# Patient Record
Sex: FEMALE | Race: BLACK OR AFRICAN AMERICAN | NOT HISPANIC OR LATINO | Employment: OTHER | URBAN - METROPOLITAN AREA
[De-identification: names, ages, dates, MRNs, and addresses within clinical notes are randomized per-mention and may not be internally consistent; named-entity substitution may affect disease eponyms.]

---

## 2019-03-20 ENCOUNTER — OFFICE VISIT (OUTPATIENT)
Dept: DERMATOLOGY | Facility: CLINIC | Age: 59
End: 2019-03-20
Payer: MEDICARE

## 2019-03-20 DIAGNOSIS — M67.40 GANGLION CYST: Primary | ICD-10-CM

## 2019-03-20 DIAGNOSIS — B35.4 TINEA CORPORIS: ICD-10-CM

## 2019-03-20 PROCEDURE — 99203 OFFICE O/P NEW LOW 30 MIN: CPT | Mod: S$GLB,,, | Performed by: DERMATOLOGY

## 2019-03-20 PROCEDURE — 99999 PR PBB SHADOW E&M-NEW PATIENT-LVL III: ICD-10-PCS | Mod: PBBFAC,,, | Performed by: DERMATOLOGY

## 2019-03-20 PROCEDURE — 99999 PR PBB SHADOW E&M-NEW PATIENT-LVL III: CPT | Mod: PBBFAC,,, | Performed by: DERMATOLOGY

## 2019-03-20 PROCEDURE — 99203 PR OFFICE/OUTPT VISIT, NEW, LEVL III, 30-44 MIN: ICD-10-PCS | Mod: S$GLB,,, | Performed by: DERMATOLOGY

## 2019-03-20 RX ORDER — ALBUTEROL SULFATE 5 MG/ML
2.5 SOLUTION RESPIRATORY (INHALATION) EVERY 6 HOURS PRN
COMMUNITY

## 2019-03-20 RX ORDER — MOMETASONE FUROATE 50 UG/1
2 SPRAY, METERED NASAL DAILY
COMMUNITY

## 2019-03-20 RX ORDER — GLIMEPIRIDE 4 MG/1
4 TABLET ORAL
COMMUNITY
End: 2019-10-17

## 2019-03-20 RX ORDER — CYCLOBENZAPRINE HCL 10 MG
10 TABLET ORAL 3 TIMES DAILY PRN
COMMUNITY

## 2019-03-20 RX ORDER — ECONAZOLE NITRATE 10 MG/G
CREAM TOPICAL
Qty: 85 G | Refills: 1 | Status: SHIPPED | OUTPATIENT
Start: 2019-03-20

## 2019-03-20 NOTE — PROGRESS NOTES
Subjective:       Patient ID:  Geno Marrero is a 58 y.o. female who presents for   Chief Complaint   Patient presents with    Rash     c/o rash to back and left inner thigh x several yrs,,itchy and scaly    Cyst     c/o cyst to right wrist     History of Present Illness: The patient presents with chief complaint of rash.  Location: back and left inner thigh  Duration: several yrs  Signs/Symptoms: itchy and scaly    Prior treatments: Anti-itch cream, HTC cream    She also c/o cyst of the right wrist near radial artery x 1 month, + aching pain. No tx tried.         Review of Systems   Constitutional: Negative for fever and chills.   Gastrointestinal: Negative for nausea and vomiting.   Skin: Positive for itching, rash and dry skin. Negative for daily sunscreen use, activity-related sunscreen use and recent sunburn.   Hematologic/Lymphatic: Does not bruise/bleed easily.        Objective:    Physical Exam   Constitutional: She appears well-developed and well-nourished. No distress.   Neurological: She is alert and oriented to person, place, and time. She is not disoriented.   Psychiatric: She has a normal mood and affect.   Skin:   Areas Examined (abnormalities noted in diagram):   Head / Face Inspection Performed  Neck Inspection Performed  Chest / Axilla Inspection Performed  Abdomen Inspection Performed  Genitals / Buttocks / Groin Inspection Performed  Back Inspection Performed  RUE Inspected  LUE Inspection Performed  RLE Inspected  LLE Inspection Performed  Nails and Digits Inspection Performed                  KOH: positive for few hyphae    Assessment / Plan:        Ganglion cyst  -     Ambulatory referral to Hand Surgery  -     Right wrist    Tinea corporis  -     econazole nitrate 1 % cream; AAA bid  Dispense: 85 g; Refill: 1  -     Given drug allergies and co-morbidities, will avoid PO lamisil.  Will start above med and discussed fomite reduction.  Consider punch biopsy to r/o other rash if no  improvement.                Follow-up in about 6 weeks (around 5/1/2019) for follow up with MAYELIN Sofia.

## 2019-03-20 NOTE — PATIENT INSTRUCTIONS
Ringworm of the Skin    Ringworm is a fungal infection of the skin. Despite the name, a worm doesn't cause it. The cause of ringworm is a fungus that infects the outer layers of the skin. It is also not caused by bed bugs, scabies, or lice. These are totally different.  The medical term for ringworm is tinea. It can affect most parts of your body, although it seems to do better in moist areas of the body and around hair. It can be on almost any part of your body, including:  · Arms, hands, legs, chest, feet, and back  · Scalp  · Beard  · Groin  · Between the toes  Depending on where it is located, sometimes the name changes:  · Tinea capitis (scalp)  · Tinea cruris (groin)  · Tinea corporis (body)  · Tinea pedis (feet)  Causes  Ringworm is very common all over the world, including the [place] It can take less than 1 week up to 2 weeks before you develop the infection after being exposed. So, you may not figure out the exact cause.  It is spread through direct contact with:  · An infected person or animal  · Infected soil, or objects such as towels, clothing, and farley  Symptoms  At first you might not notice ringworm. Or you may just see a small, red, often raised itchy spot or pimple. Sometimes there may only be one spot. At other times there may be several. Ringworm can look slightly different on different parts of the body, but there are some things are always present:  · Irregular, round, oval or ring-shaped, which is why it's called ringworm  · Clearer or lighter color at the center, since it spreads from the center of the spot outward  · Red or inflamed look  · Raised  · Itchy  · Scaly, dry, or flaky  Home care  Follow these tips to help care for yourself at home:  · Leave it alone. Don't scratch at the rash or pick it. This can increase the chance of infection and scarring.  · Take medicine as prescribed. If you were prescribed a cream, apply it exactly as directed. Make sure to put the cream not just on the  rash, but also on the skin 1 or 2 inches around it. Medicine by mouth is sometimes needed, particularly for ringworm on the scalp. Take it as directed and until your healthcare provider says to stop.  · Keep it from spreading to others. Untreated ringworm of the skin is contagious by skin-to-skin contact. Your child may return to school 2 days after treatment has started.  Prevention  To some degree, prevention depends on what part of your body was affected. In general, the following good hygiene can help.  · Clean up after you get dirty or sweaty, or after using a locker room.  · When possible, dont share farley and brushes.  · Avoid having your skin and feet wet or damp for long periods.  · Wear clean, loose-fitting underwear.  Follow-up care  Follow up with your healthcare provider as advised by our staff if the rash does not improve after 10 days of treatment or if the rash spreads to other areas of the body.  When to seek medical advice  Call your healthcare provider right away if any of these occur:  · Redness around the rash gets worse  · Fluid drains from the rash  · Fever of 100.4ºF (38ºC) or higher, or as directed by your healthcare provider  Date Last Reviewed: 8/1/2016  © 9875-1677 [employer]. [address] [place] [address], [place] [address]. All rights reserved. This information is not intended as a substitute for professional medical care. Always follow your healthcare professional's instructions.

## 2019-10-11 ENCOUNTER — OFFICE VISIT (OUTPATIENT)
Dept: ENDOCRINOLOGY | Facility: CLINIC | Age: 59
End: 2019-10-11
Payer: MEDICARE

## 2019-10-11 VITALS
WEIGHT: 293 LBS | SYSTOLIC BLOOD PRESSURE: 152 MMHG | BODY MASS INDEX: 48.05 KG/M2 | TEMPERATURE: 99 F | DIASTOLIC BLOOD PRESSURE: 86 MMHG | HEART RATE: 66 BPM | RESPIRATION RATE: 18 BRPM

## 2019-10-11 DIAGNOSIS — E11.65 UNCONTROLLED TYPE 2 DIABETES MELLITUS WITH HYPERGLYCEMIA: Primary | ICD-10-CM

## 2019-10-11 DIAGNOSIS — E66.01 MORBID OBESITY: ICD-10-CM

## 2019-10-11 DIAGNOSIS — L83 ACANTHOSIS NIGRICANS: ICD-10-CM

## 2019-10-11 DIAGNOSIS — R73.09 ELEVATED HEMOGLOBIN A1C: ICD-10-CM

## 2019-10-11 LAB
GLUCOSE SERPL-MCNC: 74 MG/DL (ref 70–110)
GLUCOSE SERPL-MCNC: 85 MG/DL (ref 70–110)

## 2019-10-11 PROCEDURE — 3008F BODY MASS INDEX DOCD: CPT | Mod: CPTII,S$GLB,, | Performed by: INTERNAL MEDICINE

## 2019-10-11 PROCEDURE — 99999 PR PBB SHADOW E&M-EST. PATIENT-LVL III: CPT | Mod: PBBFAC,,, | Performed by: INTERNAL MEDICINE

## 2019-10-11 PROCEDURE — 99204 PR OFFICE/OUTPT VISIT, NEW, LEVL IV, 45-59 MIN: ICD-10-PCS | Mod: 25,S$GLB,, | Performed by: INTERNAL MEDICINE

## 2019-10-11 PROCEDURE — 95250 PR GLUCOSE MONITORING,72 HRS,SUB-Q SENSOR: ICD-10-PCS | Mod: S$GLB,,, | Performed by: INTERNAL MEDICINE

## 2019-10-11 PROCEDURE — 99999 PR PBB SHADOW E&M-EST. PATIENT-LVL III: ICD-10-PCS | Mod: PBBFAC,,, | Performed by: INTERNAL MEDICINE

## 2019-10-11 PROCEDURE — 3008F PR BODY MASS INDEX (BMI) DOCUMENTED: ICD-10-PCS | Mod: CPTII,S$GLB,, | Performed by: INTERNAL MEDICINE

## 2019-10-11 PROCEDURE — 99204 OFFICE O/P NEW MOD 45 MIN: CPT | Mod: 25,S$GLB,, | Performed by: INTERNAL MEDICINE

## 2019-10-11 PROCEDURE — 95250 CONT GLUC MNTR PHYS/QHP EQP: CPT | Mod: S$GLB,,, | Performed by: INTERNAL MEDICINE

## 2019-10-11 RX ORDER — PEN NEEDLE, DIABETIC 31 GX5/16"
NEEDLE, DISPOSABLE MISCELLANEOUS
Refills: 2 | COMMUNITY
Start: 2019-07-11

## 2019-10-11 RX ORDER — INSULIN LISPRO 100 [IU]/ML
INJECTION, SOLUTION INTRAVENOUS; SUBCUTANEOUS
Qty: 15 ML | Refills: 1 | Status: SHIPPED | OUTPATIENT
Start: 2019-10-11 | End: 2019-10-11

## 2019-10-11 RX ORDER — INSULIN DEGLUDEC 100 U/ML
INJECTION, SOLUTION SUBCUTANEOUS
COMMUNITY
Start: 2019-10-07

## 2019-10-11 RX ORDER — ERGOCALCIFEROL 1.25 MG/1
50000 CAPSULE ORAL
COMMUNITY

## 2019-10-11 RX ORDER — INSULIN ASPART 100 [IU]/ML
INJECTION, SOLUTION INTRAVENOUS; SUBCUTANEOUS
Qty: 15 ML | Refills: 1 | Status: SHIPPED | OUTPATIENT
Start: 2019-10-11

## 2019-10-11 NOTE — PROGRESS NOTES
Reason for referral:     Patient ID: Geno Marrero is a 58 y.o. female.    Chief Complaint:   Establish Care (diabetes)    HPI  Patient is by herself.  She came here by transportation.  DM diagnosed: 2006 2014 was last visit to an endocrinologist  PCP , Dr. Linton in [place] has been [place]  [place]  Last A1c was 11, up from 9 per pt  Oral medication:   Glimperide 4 mg tab, 2 tabs daily  Victoza 1.8 mg  X > 2 years  Tresiba 48 units at night ( increased from 38)  Humulin R  Caused increased heart rate , imbalance and blurred vision after taking 3 doses  Jardiance caused yeast infection more than once  3 meals, largest meal is at 2-3 pm  Frequency of CBGS: 2 times a day  CBGs: 123, 133,143  occ 200  This am 88 . Pt skipped having breakfast today.  Skipping insulin injections: no  Hypoglycemia no  Eye exam within last year: ?  Kidney problem: No  Pain or numbness in feet: No  Taking cholesterol medication: yes  No pancreatitis history  L lymphedema  After partial hys in mid 90s  Following diet for diabetes: yes  History of diabetes education : yes  Off Prednisone x months  + Asthma    No complaints of  dysphagia, n/v, cp, sob, abd pain, rash, or edema.         Past Medical History:   Diagnosis Date    Arthritis     Diabetes mellitus, type 2        Past Surgical History:   Procedure Laterality Date    KNEE ARTHROSCOPY W/ MENISCAL REPAIR Right 2012    KNEE ARTHROSCOPY W/ MENISCAL REPAIR Left 04/2014    PARTIAL HYSTERECTOMY  1996    SHOULDER SURGERY Left 2000    SHOULDER SURGERY Right 2010       Social History     Socioeconomic History    Marital status: Single     Spouse name: Not on file    Number of children: Not on file    Years of education: Not on file    Highest education level: Not on file   Occupational History    Not on file   Social Needs    Financial resource strain: Not on file    Food insecurity:     Worry: Not on file     Inability: Not on file    Transportation  needs:     Medical: Not on file     Non-medical: Not on file   Tobacco Use    Smoking status: Never Smoker    Smokeless tobacco: Never Used   Substance and Sexual Activity    Alcohol use: No    Drug use: No    Sexual activity: Never   Lifestyle    Physical activity:     Days per week: Not on file     Minutes per session: Not on file    Stress: Not on file   Relationships    Social connections:     Talks on phone: Not on file     Gets together: Not on file     Attends Sikhism service: Not on file     Active member of club or organization: Not on file     Attends meetings of clubs or organizations: Not on file     Relationship status: Not on file   Other Topics Concern    Are you pregnant or think you may be? Not Asked    Breast-feeding Not Asked   Social History Narrative    Not on file       ROS:   Included in HPI  + Diabetes    hypoglycemia not recently  No chest pain or shortness of breath  No abdominal pain , nausea or vomiting  ROS otherwise neg except for what is mentioned in the PMH, PSH and HPI    PE:  Vitals:    10/11/19 0901   BP: (!) 152/86   Pulse: 66   Resp: 18   Temp: 98.8 °F (37.1 °C)     Alert and oriented  No acute distress  + Obesity  Body mass index is 48.05 kg/m².  + Acanthosis nigricans  No acne  No Proptosis or conjunctivitis  No rash on tongue, + teeth  Nose nl  No goitre by inspection  Thyroid gland is not palpable  No cervical lymphadenopathy  Heart reg, no gallop  Lungs cta, no wheezing  Abd soft, no tnd; abdominal obesity, acanthosis upper mid abdomen  + edema in lower legs  No ulcers in feet  Dorsalis pedis symmetrical  Nl sensation by microfilament  No rash  No bruises  Speech normal  Behavior normal  No tremor      Lab Review:   Labs for lipid panel and A1c reviewed from lab report which was obtained by fax during this visit    A/P  Uncontrolled type 2 diabetes mellitus with hyperglycemia  Elevated hemoglobin A1c  A1c was 11.5  CBGs do not reflect very high A1c, likely  because of the increase in insulin dose recently  Long-acting insulin dose will be reduced from 48 units to 38 units  Patient can continue on Victoza  Glimepiride dose to be changed to only once a day  Humalog will be added for the afternoon meal  Glucose sensor will be applied today    -     insulin lispro (HUMALOG KWIKPEN INSULIN) 100 unit/mL pen; Six units at the afternoon meal  Dispense: 15 mL; Refill: 1  Morbid obesity  Acanthosis nigricans    Orders Placed This Encounter   Procedures    Comprehensive metabolic panel     Standing Status:   Future     Standing Expiration Date:   12/11/2020    GLUCOSE MONITORING CONTINUOUS MIN 72 HOURS     Standing Status:   Future     Standing Expiration Date:   1/11/2020    POCT Glucose, Hand-Held Device    POCT Glucose, Hand-Held Device          Patient Instructions   Glimperide 4 mg tab,one tablet daily  Victoza 1.8 mg    Tresiba 38  Humalog, fast acting insulin: 6 units just at the afternoon meal    ----------------  Check your blood sugar before each meal and at bedtime.    ----------------    POCT Glucose 74  85 20 min after drinking orange juice      Follow-up visit in 1 week    Patient understands and agrees on the plan.    Patient said she was told she had MRI in 2003 or 2004 by EKG.  She has joint pain in her hands and both shoulders.

## 2019-10-11 NOTE — PATIENT INSTRUCTIONS
Glimperide 4 mg tab,ONE tablet A DAY ONLY  Victoza 1.8 mg    Tresiba 38  Humalog, fast acting insulin: 6 units just at the afternoon meal    ----------------  Check your blood sugar before each meal and at bedtime.    ----------------

## 2019-10-11 NOTE — PROGRESS NOTES
Patient is here in clinic today for placement of continuous glucose monitoring sensor (CGMS). Site is the back of her left upper arm. Site was cleaned and sensor was placed and started. Patient didn't ask any further questions.Patient was instructed to continue all daily activities such as shower and also check blood glucose levels are instructed. Patient was also instructed to avoid any imaging procedures and Acetaminophen during her procedure. Patient voiced understanding of all instructions given. Patient is scheduled to come back in 1 week as per  ordered for removal of sensor.

## 2019-10-17 ENCOUNTER — LAB VISIT (OUTPATIENT)
Dept: LAB | Facility: HOSPITAL | Age: 59
End: 2019-10-17
Attending: INTERNAL MEDICINE
Payer: MEDICARE

## 2019-10-17 ENCOUNTER — OFFICE VISIT (OUTPATIENT)
Dept: ENDOCRINOLOGY | Facility: CLINIC | Age: 59
End: 2019-10-17
Payer: MEDICARE

## 2019-10-17 VITALS
WEIGHT: 293 LBS | DIASTOLIC BLOOD PRESSURE: 101 MMHG | TEMPERATURE: 98 F | HEART RATE: 64 BPM | SYSTOLIC BLOOD PRESSURE: 158 MMHG | BODY MASS INDEX: 43.4 KG/M2 | HEIGHT: 69 IN

## 2019-10-17 DIAGNOSIS — R73.09 ELEVATED HEMOGLOBIN A1C: ICD-10-CM

## 2019-10-17 DIAGNOSIS — E66.01 MORBID OBESITY: ICD-10-CM

## 2019-10-17 DIAGNOSIS — E11.65 UNCONTROLLED TYPE 2 DIABETES MELLITUS WITH HYPERGLYCEMIA: ICD-10-CM

## 2019-10-17 DIAGNOSIS — E11.65 UNCONTROLLED TYPE 2 DIABETES MELLITUS WITH HYPERGLYCEMIA: Primary | ICD-10-CM

## 2019-10-17 LAB
ALBUMIN SERPL BCP-MCNC: 3.6 G/DL (ref 3.5–5.2)
ALP SERPL-CCNC: 81 U/L (ref 55–135)
ALT SERPL W/O P-5'-P-CCNC: 11 U/L (ref 10–44)
ANION GAP SERPL CALC-SCNC: 11 MMOL/L (ref 8–16)
AST SERPL-CCNC: 13 U/L (ref 10–40)
BILIRUB SERPL-MCNC: 0.4 MG/DL (ref 0.1–1)
BUN SERPL-MCNC: 9 MG/DL (ref 6–20)
CALCIUM SERPL-MCNC: 9.6 MG/DL (ref 8.7–10.5)
CHLORIDE SERPL-SCNC: 104 MMOL/L (ref 95–110)
CO2 SERPL-SCNC: 29 MMOL/L (ref 23–29)
CREAT SERPL-MCNC: 0.8 MG/DL (ref 0.5–1.4)
EST. GFR  (AFRICAN AMERICAN): >60 ML/MIN/1.73 M^2
EST. GFR  (NON AFRICAN AMERICAN): >60 ML/MIN/1.73 M^2
GLUCOSE SERPL-MCNC: 120 MG/DL (ref 70–110)
POTASSIUM SERPL-SCNC: 3.8 MMOL/L (ref 3.5–5.1)
PROT SERPL-MCNC: 7.4 G/DL (ref 6–8.4)
SODIUM SERPL-SCNC: 144 MMOL/L (ref 136–145)

## 2019-10-17 PROCEDURE — 99999 PR PBB SHADOW E&M-EST. PATIENT-LVL III: ICD-10-PCS | Mod: PBBFAC,,, | Performed by: INTERNAL MEDICINE

## 2019-10-17 PROCEDURE — 95251 CONT GLUC MNTR ANALYSIS I&R: CPT | Mod: S$GLB,,, | Performed by: INTERNAL MEDICINE

## 2019-10-17 PROCEDURE — 3008F PR BODY MASS INDEX (BMI) DOCUMENTED: ICD-10-PCS | Mod: CPTII,S$GLB,, | Performed by: INTERNAL MEDICINE

## 2019-10-17 PROCEDURE — 99999 PR PBB SHADOW E&M-EST. PATIENT-LVL III: CPT | Mod: PBBFAC,,, | Performed by: INTERNAL MEDICINE

## 2019-10-17 PROCEDURE — 3008F BODY MASS INDEX DOCD: CPT | Mod: CPTII,S$GLB,, | Performed by: INTERNAL MEDICINE

## 2019-10-17 PROCEDURE — 99214 PR OFFICE/OUTPT VISIT, EST, LEVL IV, 30-39 MIN: ICD-10-PCS | Mod: 25,S$GLB,, | Performed by: INTERNAL MEDICINE

## 2019-10-17 PROCEDURE — 36415 COLL VENOUS BLD VENIPUNCTURE: CPT

## 2019-10-17 PROCEDURE — 95251 PR GLUCOSE MONITOR, 72 HOUR, PHYS INTERP: ICD-10-PCS | Mod: S$GLB,,, | Performed by: INTERNAL MEDICINE

## 2019-10-17 PROCEDURE — 99214 OFFICE O/P EST MOD 30 MIN: CPT | Mod: 25,S$GLB,, | Performed by: INTERNAL MEDICINE

## 2019-10-17 PROCEDURE — 80053 COMPREHEN METABOLIC PANEL: CPT

## 2019-10-17 RX ORDER — GLIMEPIRIDE 2 MG/1
TABLET ORAL
Qty: 30 TABLET | Refills: 2 | Status: SHIPPED | OUTPATIENT
Start: 2019-10-17 | End: 2019-10-17

## 2019-10-17 RX ORDER — METFORMIN HYDROCHLORIDE 500 MG/1
TABLET ORAL
Qty: 60 TABLET | Refills: 1 | Status: SHIPPED | OUTPATIENT
Start: 2019-10-17 | End: 2019-11-09 | Stop reason: SDUPTHER

## 2019-10-17 NOTE — PROGRESS NOTES
Reason for referral:     Patient ID: Geno Marrero is a 58 y.o. female.    Chief Complaint:   Follow-up (1 week follow up )    HPI  Pt came here by transportation.  DM diagnosed: 2006 2014 was last visit to an endocrinologist prior to the initial visit here  PCP , Dr. Linton in [place] has been treating [place]  [place]  Last A1c was 1.5, up from 9 per pt  Glucose sensor downloaded and reviewed  Oral medication:   Glimperide 4 mg tab once a day after reducing the dose from twice a day after last visit  Victoza 1.8 mg  X > 2 years  Tresiba 38 units after reducing the dose from 04/08 units after last visit  Humalog 6 units once a day with the big meal  Humulin R  Caused increased heart rate , imbalance and blurred vision after taking 3 doses  Jardiance caused yeast infection more than once  3 meals, largest meal is at 2-3 pm  Frequency of CBGS: 2 times a day    Eye exam within last year: ?  Kidney problem: No  Pain or numbness in feet: No  Taking cholesterol medication: yes  No pancreatitis history  L leg  lymphedema  After partial hys in mid 90s  Following diet for diabetes: yes  History of diabetes education : yes  Off Prednisone x months  + Asthma    No complaints of  dysphagia, n/v, cp, sob, abd pain, rash, or edema.         Past Medical History:   Diagnosis Date    Arthritis     Diabetes mellitus, type 2        Past Surgical History:   Procedure Laterality Date    KNEE ARTHROSCOPY W/ MENISCAL REPAIR Right 2012    KNEE ARTHROSCOPY W/ MENISCAL REPAIR Left 04/2014    PARTIAL HYSTERECTOMY  1996    SHOULDER SURGERY Left 2000    SHOULDER SURGERY Right 2010       Social History     Socioeconomic History    Marital status: Single     Spouse name: Not on file    Number of children: Not on file    Years of education: Not on file    Highest education level: Not on file   Occupational History    Not on file   Social Needs    Financial resource strain: Not on file    Food insecurity:      Worry: Not on file     Inability: Not on file    Transportation needs:     Medical: Not on file     Non-medical: Not on file   Tobacco Use    Smoking status: Never Smoker    Smokeless tobacco: Never Used   Substance and Sexual Activity    Alcohol use: No    Drug use: No    Sexual activity: Never   Lifestyle    Physical activity:     Days per week: Not on file     Minutes per session: Not on file    Stress: Not on file   Relationships    Social connections:     Talks on phone: Not on file     Gets together: Not on file     Attends Christianity service: Not on file     Active member of club or organization: Not on file     Attends meetings of clubs or organizations: Not on file     Relationship status: Not on file   Other Topics Concern    Are you pregnant or think you may be? Not Asked    Breast-feeding Not Asked   Social History Narrative    Not on file       ROS:   + Diabetes   No chest pain or shortness of breath  No abdominal pain , nausea or vomiting  Every joint is hurting, then pressure is up  MI diagnosed in 2003 or 2004 by EKG.  She has joint pain in her hands and both shoulders.  Lymphedema 1996 after partial Hysterectomy in 96      PE:  Vitals:    10/17/19 0948   BP: (!) 158/101   Pulse: 64   Temp: 98.4 °F (36.9 °C)     Alert and oriented  No acute distress  + Obesity  Body mass index is 48.09 kg/m².  + Acanthosis nigricans  No Proptosis or conjunctivitis  No goitre by inspection  Thyroid gland is not palpable  No cervical lymphadenopathy  Heart reg, no gallop  Lungs cta, no wheezing  Abd soft, no tnd; abdominal obesity  + mild edema in L lower legs  Speech normal  Behavior normal  No tremor      Lab Review:   Labs for lipid panel and A1c reviewed from lab report which was obtained by fax during last visit    A/P  Uncontrolled type 2 diabetes mellitus with hyperglycemia  Elevated hemoglobin A1c  A1c was 11.5  CBGs do not reflect very high A1c, likely because of the increase in insulin dose  recently  Glucose sensor data downloaded and reviewed  Standard deviation of glucose levels is relatively not wide  Glucose peak occurred different times different days  Glucose level dropped sometimes to 70-80  Average glucose levels mostly have been about 145  So glimepiride dose will be reduced and the long-acting insulin dose will be reduced only a little as follows:         Glimperide 2 mg tab, one tablet daily  Victoza 1.8 mg    Tresiba 35  Humalog, fast acting insulin: 6 units just at the afternoon meal  No metformin will be prescribed for now  Blood test for CMP to be done today    Morbid obesity  Acanthosis nigricans      Follow-up visit in  4 weeks    Patient understands and agrees on the plan.

## 2019-10-17 NOTE — PATIENT INSTRUCTIONS
Glimperide 2 mg tablet,  one tablet daily  Victoza 1.8 mg    Tresiba 35  Humalog, fast acting insulin: 6 units just at the afternoon meal  No Metformin for now. Will see what the lab report will show.    ----------------  Check your blood sugar before each meal and at bedtime.    ----------------    Monitor your Blood pressure at home. Check with your Primary Care Physician.

## 2019-11-11 RX ORDER — METFORMIN HYDROCHLORIDE 500 MG/1
TABLET ORAL
Qty: 30 TABLET | Refills: 1 | Status: SHIPPED | OUTPATIENT
Start: 2019-11-11 | End: 2020-01-13

## 2019-12-05 RX ORDER — METFORMIN HYDROCHLORIDE 500 MG/1
TABLET ORAL
Qty: 30 TABLET | Refills: 1 | OUTPATIENT
Start: 2019-12-05

## 2020-01-13 RX ORDER — METFORMIN HYDROCHLORIDE 500 MG/1
TABLET ORAL
Qty: 30 TABLET | Refills: 0 | Status: SHIPPED | OUTPATIENT
Start: 2020-01-13

## 2020-09-28 ENCOUNTER — OFFICE VISIT (OUTPATIENT)
Dept: OTOLARYNGOLOGY | Facility: CLINIC | Age: 60
End: 2020-09-28
Payer: MEDICARE

## 2020-09-28 VITALS — TEMPERATURE: 97 F | BODY MASS INDEX: 45.99 KG/M2 | WEIGHT: 293 LBS | HEIGHT: 67 IN

## 2020-09-28 DIAGNOSIS — H60.91 OTITIS EXTERNA OF RIGHT EAR, UNSPECIFIED CHRONICITY, UNSPECIFIED TYPE: ICD-10-CM

## 2020-09-28 DIAGNOSIS — H65.91 MIDDLE EAR EFFUSION, RIGHT: Primary | ICD-10-CM

## 2020-09-28 PROCEDURE — 3008F PR BODY MASS INDEX (BMI) DOCUMENTED: ICD-10-PCS | Mod: CPTII,S$GLB,, | Performed by: PHYSICIAN ASSISTANT

## 2020-09-28 PROCEDURE — 99999 PR PBB SHADOW E&M-EST. PATIENT-LVL IV: ICD-10-PCS | Mod: PBBFAC,,, | Performed by: PHYSICIAN ASSISTANT

## 2020-09-28 PROCEDURE — 3008F BODY MASS INDEX DOCD: CPT | Mod: CPTII,S$GLB,, | Performed by: PHYSICIAN ASSISTANT

## 2020-09-28 PROCEDURE — 99999 PR PBB SHADOW E&M-EST. PATIENT-LVL IV: CPT | Mod: PBBFAC,,, | Performed by: PHYSICIAN ASSISTANT

## 2020-09-28 PROCEDURE — 99203 OFFICE O/P NEW LOW 30 MIN: CPT | Mod: S$GLB,,, | Performed by: PHYSICIAN ASSISTANT

## 2020-09-28 PROCEDURE — 99203 PR OFFICE/OUTPT VISIT, NEW, LEVL III, 30-44 MIN: ICD-10-PCS | Mod: S$GLB,,, | Performed by: PHYSICIAN ASSISTANT

## 2020-09-28 RX ORDER — NEOMYCIN SULFATE, POLYMYXIN B SULFATE, HYDROCORTISONE 3.5; 10000; 1 MG/ML; [USP'U]/ML; MG/ML
SOLUTION/ DROPS AURICULAR (OTIC)
Qty: 1 BOTTLE | Refills: 0 | Status: SHIPPED | OUTPATIENT
Start: 2020-09-28

## 2020-09-28 RX ORDER — FLUTICASONE PROPIONATE 50 MCG
2 SPRAY, SUSPENSION (ML) NASAL 2 TIMES DAILY
Qty: 9.9 ML | Refills: 11 | Status: SHIPPED | OUTPATIENT
Start: 2020-09-28

## 2020-09-28 RX ORDER — METHYLPREDNISOLONE 4 MG/1
TABLET ORAL
Qty: 1 PACKAGE | Refills: 0 | Status: SHIPPED | OUTPATIENT
Start: 2020-09-28 | End: 2020-10-19

## 2020-09-28 NOTE — PROGRESS NOTES
Referring Provider:    Angireferral Self  No address on file  Subjective:   Patient: Geno Marrero [id number], :1960   Visit date:2020 9:29 AM    Chief Complaint:  Other (right ear pain.)    HPI:  Geno is a 59 y.o. female who I was asked to see in consultation for evaluation of the following issue(s):    Patient presented to clinic on 20 for an evaluation of R ear pain:    The patient reports the following symptoms (negative unless checked off):     [x] Ear pressure/ pain    [x] right   [] left   [] bilateral  [] Muffled hearing/ hearing loss  [] Ear popping/ crackling  [x] Nasal congestion  [] Facial pressure  [] Tinnitus  [] Dizziness  [x] Other - pressure in ear canal, throbbing. No otorrhea      Medications:  []Anti-histamines:   [] Allegra   [] Benadryl   [] Claritin   [] Xyzal   [x] Zyrtec   [] other  [] Singulair  [] Nasal Sprays:   [] Astelin   [] Atrovent   [] Flonase   [] Nasacort   [] Nasonex   [] Other  [] Oral steroid  [] IM steroid  [] Antibiotics:   [x] Amoxicillin   [] Augmentin   [] Azithromycin   [] Bactrim   [] Cefdinir   [] Clindamycin   [] Doxycycline   [] Levaquin   [] Other  [] Other    No hx of ear surgery.   +PND, clear. Hx of allergies, not tested.   No other cold ssx.   No hx of TMJ, no jaw pain w/ eating.    Hx DM Type II, controlled.     Review of Systems:  Negative unless checked off.  Gen:  []fever   []fatigue  HENT:  []nosebleeds  []dental problem   Eyes:  []photophobia  []visual disturbance  Resp:  []chest tightness []wheezing  Card:  []chest pain  []leg swelling  GI:  []abdominal pain []blood in stool  :  []dysuria  []hematuria  Musc:  []joint swelling  []gait problem  Skin:  []color change  []pallor  Neuro:  []seizures  []numbness  Hem:  []bruise/bleed easily  Psych:  []hallucinations  []behavioral problems  Allergy/Imm: is allergic to cefuroxime; ciprofloxacin hcl; erythromycin; janumet [sitagliptin-metformin]; codeine; doxycycline; humulin r regular u-100 insuln  "[insulin regular human]; hydrocodone; morphine; demerol [meperidine]; and singulair [montelukast].    Her meds, allergies, medical, surgical, social & family histories were reviewed & updated:  -     She has a current medication list which includes the following prescription(s): advair diskus, albuterol, aspirin, bd ultra-fine mini pen needle, crestor, enalapril, fluzone quad 9090-8696 (pf), furosemide, liraglutide 0.6 mg/0.1 ml (18 mg/3 ml) subq pnij, meloxicam, mometasone, tramadol, tresiba flextouch u-100, zolpidem, bydureon, cyclobenzaprine, econazole nitrate, ergocalciferol, fluticasone propionate, insulin aspart u-100, metformin, methylprednisolone, neomycin-polymyxin-hydrocortisone, and prednisone.  -     She  has a past medical history of Arthritis and Diabetes mellitus, type 2.   -     She does not have any pertinent problems on file.   -     She  has a past surgical history that includes Partial hysterectomy (1996); Shoulder surgery (Left, 2000); Shoulder surgery (Right, 2010); Knee arthroscopy w/ meniscal repair (Right, 2012); and Knee arthroscopy w/ meniscal repair (Left, 04/2014).  -     She  reports that she has never smoked. She has never used smokeless tobacco. She reports that she does not drink alcohol or use drugs.  -     Her family history includes Cancer in her maternal grandfather; Heart attack in her father and mother; Heart disease in her brother and mother; Kidney disease in her maternal grandfather.  -     She is allergic to cefuroxime; ciprofloxacin hcl; erythromycin; janumet [sitagliptin-metformin]; codeine; doxycycline; humulin r regular u-100 insuln [insulin regular human]; hydrocodone; morphine; demerol [meperidine]; and singulair [montelukast].    Objective:     Physical Exam:  Vitals:  Temp 97.1 °F (36.2 °C) (Tympanic)   Ht 5' 7" (1.702 m)   Wt (!) 141.7 kg (312 lb 6.3 oz)   BMI 48.93 kg/m²   General appearance:  Well developed, well nourished    Eyes:  Extraocular motions intact, " PERRL    Communication:  no hoarseness, no dysphonia    Ears:  R TM with mild bulge, clear, intact. R EAC is with diffuse erythema and mild edema. L TM and EAC WNL.   Nose:  No masses/lesions of external nose, nasal mucosa, septum, and turbinates were within normal limits.  Mouth:  No mass/lesion of lips, teeth, gums, hard/soft palate, tongue, tonsils, or oropharynx.    Cardiovascular:  No pedal edema; Radial Pulses +2     Neck & Lymphatics:  No cervical lymphadenopathy, no neck mass/crepitus/ asymmetry, trachea is midline, no thyroid enlargement/tenderness/mass.    Psych: Oriented x3,  Alert with normal mood and affect.     Respiration/Chest:  Symmetric expansion during respiration, normal respiratory effort.    Skin:  Warm and intact. No ulcerations of face, scalp, neck.    Assessment & Plan:   Geno was seen today for other.    Diagnoses and all orders for this visit:    Middle ear effusion, right    Otitis externa of right ear, unspecified chronicity, unspecified type    Other orders  -     methylPREDNISolone (MEDROL DOSEPACK) 4 mg tablet; use as directed  -     fluticasone propionate (FLONASE) 50 mcg/actuation nasal spray; 2 sprays (100 mcg total) by Each Nostril route 2 (two) times daily.  -     neomycin-polymyxin-hydrocortisone (CORTISPORIN) otic solution; Apply 4 drops into ear three times daily for 7 days.    Recommend resuming anti-histamine (pt has Zyrtec at home), start Flonase BID, and Medrol Dosepak. Cortisporin to R ear canal, discussed dry ear precautions.   Advised rechecking this in 2-3 weeks, pt wishes to contact us for f/u. Advised audiogram prior to f/u OV.     We discussed her medical conditions, treatments and plan.  Geno should return to clinic if any issues arise (symptoms worsen or persist), otherwise we will see her back in the clinic only as needed.    Thank you for allowing me to participate in the care of Geno.      Navya Bautista PA-C  [facility]   [facility]  [address] [address].  [place] [address]  P: [phone]  F: [phone]